# Patient Record
Sex: FEMALE | Race: OTHER | HISPANIC OR LATINO | URBAN - METROPOLITAN AREA
[De-identification: names, ages, dates, MRNs, and addresses within clinical notes are randomized per-mention and may not be internally consistent; named-entity substitution may affect disease eponyms.]

---

## 2017-04-30 ENCOUNTER — EMERGENCY (EMERGENCY)
Facility: HOSPITAL | Age: 27
LOS: 1 days | Discharge: HOME | End: 2017-04-30
Attending: EMERGENCY MEDICINE
Payer: COMMERCIAL

## 2017-04-30 VITALS
OXYGEN SATURATION: 97 % | RESPIRATION RATE: 18 BRPM | HEART RATE: 67 BPM | DIASTOLIC BLOOD PRESSURE: 70 MMHG | TEMPERATURE: 98 F | HEIGHT: 63 IN | WEIGHT: 138.89 LBS | SYSTOLIC BLOOD PRESSURE: 116 MMHG

## 2017-04-30 DIAGNOSIS — K80.50 CALCULUS OF BILE DUCT WITHOUT CHOLANGITIS OR CHOLECYSTITIS WITHOUT OBSTRUCTION: ICD-10-CM

## 2017-04-30 PROCEDURE — 99284 EMERGENCY DEPT VISIT MOD MDM: CPT | Mod: 25

## 2017-04-30 RX ORDER — KETOROLAC TROMETHAMINE 30 MG/ML
30 SYRINGE (ML) INJECTION ONCE
Qty: 0 | Refills: 0 | Status: DISCONTINUED | OUTPATIENT
Start: 2017-04-30 | End: 2017-04-30

## 2017-04-30 RX ORDER — SODIUM CHLORIDE 9 MG/ML
1000 INJECTION INTRAMUSCULAR; INTRAVENOUS; SUBCUTANEOUS ONCE
Qty: 0 | Refills: 0 | Status: COMPLETED | OUTPATIENT
Start: 2017-04-30 | End: 2017-04-30

## 2017-05-01 PROCEDURE — 85610 PROTHROMBIN TIME: CPT

## 2017-05-01 PROCEDURE — 85027 COMPLETE CBC AUTOMATED: CPT

## 2017-05-01 PROCEDURE — 85730 THROMBOPLASTIN TIME PARTIAL: CPT

## 2017-05-01 PROCEDURE — 81001 URINALYSIS AUTO W/SCOPE: CPT

## 2017-05-01 PROCEDURE — 87086 URINE CULTURE/COLONY COUNT: CPT

## 2017-05-01 PROCEDURE — 80053 COMPREHEN METABOLIC PANEL: CPT

## 2017-05-01 PROCEDURE — 84702 CHORIONIC GONADOTROPIN TEST: CPT

## 2017-05-01 PROCEDURE — 96374 THER/PROPH/DIAG INJ IV PUSH: CPT

## 2017-05-01 PROCEDURE — 83690 ASSAY OF LIPASE: CPT

## 2017-05-01 PROCEDURE — 99284 EMERGENCY DEPT VISIT MOD MDM: CPT | Mod: 25

## 2017-05-01 RX ADMIN — SODIUM CHLORIDE 1000 MILLILITER(S): 9 INJECTION INTRAMUSCULAR; INTRAVENOUS; SUBCUTANEOUS at 00:41

## 2017-05-01 RX ADMIN — Medication 30 MILLIGRAM(S): at 01:46

## 2017-05-01 RX ADMIN — Medication 30 MILLIGRAM(S): at 00:41

## 2017-05-01 NOTE — ED PROVIDER NOTE - NS ED MD SCRIBE ATTENDING SCRIBE SECTIONS
PHYSICAL EXAM/REVIEW OF SYSTEMS/DISPOSITION/PAST MEDICAL/SURGICAL/SOCIAL HISTORY/VITAL SIGNS( Pullset)/HIV/HISTORY OF PRESENT ILLNESS

## 2017-05-01 NOTE — ED PROVIDER NOTE - OBJECTIVE STATEMENT
28 y/o F pt with no significant PMHx presents to ED c/o RUQ abd pain x 5 days, worsening today, with associating Sx of chills and vomiting. Pt reports visiting the [facility] for Sx, was told she has gallstones and recommended that she f/u with a surgeon; however, pt has not f/u with surgeon. Pt notes Sx are improved with OTC medication. Pt denies fever, chills, nausea, diarrhea, chest pain, SOB, or any other complaints. NKDA.

## 2017-05-01 NOTE — ED PROVIDER NOTE - MEDICAL DECISION MAKING DETAILS
26 y/o F pt with gallstones, has not f/u with surgeon, here with RUQ abd pain. 28 y/o F pt with gallstones, has not f/u with surgeon, here with RUQ abd pain.  pt seen here earlier tonight, given GI cocktail for presumed GERD, returned 2/2 not feeling better.  ekg, labs sent, offered CT scan as pt did report not feeling better, but she wants to go home.  Verbalized understanding of risks of leaving before more complete workup, including death/permanent disability. Pt a/o x 3, ambulatory, VS wnl. Encouraged pt to follow up with her regular doctor and GI this AM.   Pt here w RUQ abd pain, had recent RUQ sono at [facility]- +gallstone, mild wall thickening but no PC fluid, no fevers etc, was d/c as biliary colic, has not followed up with any surgeon as of yet.  Here now bc of return of pain, no fevers, vomited once earlier, but ian PO in ER, no diarrhea, no sono at this time, offered CT scan but pt does not want 2/2 radiation, offered to let patient receive sono when available at 9am today, labs wnl and now no pain, so she wants to go home.  Discussed dietary modifications, motrin, follow up w general surgery this week. Return immediately for fever, inability to keep liquids down, pain not relieved by tylenol and motrin or any other concerns

## 2017-05-01 NOTE — ED PROVIDER NOTE - PROGRESS NOTE DETAILS
pt seen here earlier tonight, given GI cocktail for presumed GERD, returned 2/2 not feeling better.  ekg, labs sent, offered CT scan as pt did report not feeling better, but she wants to go home.  Verbalized understanding of risks of leaving before more complete workup, including death/permanent disability. Pt a/o x 3, ambulatory, VS wnl. Encouraged pt to follow up with her regular doctor and GI this AM.   Pt here w RUQ abd pain, had recent RUQ sono at [facility]- +gallstone, mild wall thickening but no PC fluid, no fevers etc, was d/c as biliary colic, has not followed up with any surgeon as of yet.  Here now bc of return of pain, no fevers, vomited once earlier, but ian PO in ER, no diarrhea, no sono at this time, offered CT scan but pt does not want 2/2 radiation, offered to let patient receive sono when available at 9am today, labs wnl and now no pain, so she wants to go home.  Discussed dietary modifications, motrin, follow up w general surgery this week. Return immediately for fever, inability to keep liquids down, pain not relieved by tylenol and motrin or any other concerns

## 2017-05-02 LAB
CULTURE RESULTS: SIGNIFICANT CHANGE UP
SPECIMEN SOURCE: SIGNIFICANT CHANGE UP